# Patient Record
Sex: FEMALE | Race: WHITE
[De-identification: names, ages, dates, MRNs, and addresses within clinical notes are randomized per-mention and may not be internally consistent; named-entity substitution may affect disease eponyms.]

---

## 2017-04-03 ENCOUNTER — HOSPITAL ENCOUNTER (OUTPATIENT)
Dept: HOSPITAL 47 - RADUSWWP | Age: 47
Discharge: HOME | End: 2017-04-03
Payer: COMMERCIAL

## 2017-04-03 DIAGNOSIS — R92.8: Primary | ICD-10-CM

## 2018-01-16 ENCOUNTER — HOSPITAL ENCOUNTER (OUTPATIENT)
Dept: HOSPITAL 47 - WWCWWP | Age: 48
Discharge: HOME | End: 2018-01-16
Payer: COMMERCIAL

## 2018-01-16 DIAGNOSIS — Z12.31: Primary | ICD-10-CM

## 2018-01-16 PROCEDURE — 77067 SCR MAMMO BI INCL CAD: CPT

## 2018-01-16 PROCEDURE — 77063 BREAST TOMOSYNTHESIS BI: CPT

## 2018-01-16 NOTE — [facility] - HISTORY AND PHYSICAL



DATE OF DICTATION:

01/16/2018.



CHIEF COMPLAINT:

The patient is here for her routine gynecologic exam and mammogram.



HPI:

This is a 47-year-old, G2, P2, with an LMP of 2008.  She is status post vaginal

hysterectomy for benign reasons.  The patient states she has occasional hot flashes at

night, which are not very severe.  She has noticed this for about 1 month.  She is

otherwise without complaints.



PAST MEDICAL HISTORY:

Allergy and exercise-induced asthma.



MEDICATIONS:

1. Zyrtec p.r.n.

2. Multivitamin daily.

3. Calcium with vitamin D 1 daily.



ALLERGIES:

No known drug allergies.



PAST SURGICAL HISTORY:

Vaginal hysterectomy with rectocele repair in 2008, cystocele repair in 2010, nasal

septum surgery 2013.



PAST GYN:

She is status post vaginal hysterectomy for benign reasons and has no history of STD's.



FAMILY HISTORY:

Aunt had breast cancer.



SOCIAL HISTORY:

She denies tobacco and drug use and has about two alcohol-containing drinks per month.

She has been  since 1999 and is a  in [place].



REVIEW OF SYSTEMS:

Weight has been stable.  She denies respiratory, cardiac or GI problems.



PHYSICAL EXAM:

Blood pressure 118/71, height 5 feet 7 inches, weight 169 pounds, BMI 26, temperature

97.8, pulse 78/

This is a well-developed, well-nourished, white female, who is alert and oriented x3,

in no acute distress.

HEENT is within normal limits.

NECK:  Supple without mass or thyromegaly.

CHEST AND LUNGS:  Clear to auscultation.

HEART:  Regular rate and rhythm.  Breasts are without mass or discharge.  Axillary exam

is negative for adenopathy.

BACK:  Negative for CVA tenderness.

ABDOMEN:  Soft, nontender, without palpable masses.

PELVIC EXAM:  Normal external genitalia.  There is no significant atrophy.  Vagina

appears normal without significant atrophy and there is no evidence of prolapse.

Bimanual exam is negative for mass or tenderness.

Rectal exam is negative for mass or tenderness and is negative for occult blood.

EXTREMITIES:  Nontender.



IMPRESSION:

A 47-year-old perimenopausal female who is status post vaginal hysterectomy with normal

gynecologic exam.



PLAN:

1. Pap smears have been discontinued.

2. Self-breast examination was discussed.

3. Mammogram will be done today.

4. Osteoporosis prevention was discussed.

5. The patient would like to increase exercise and because of her history of allergy-

    induced and exercise-induced asthma, she is requesting an albuterol inhaler

    prescription.  A prescription for ProAir HFA inhaler 2 puffs q.6 hours p.r.n. was

    given to the patient.  She will follow up with her primary care physician for

    additional prescriptions or worsening of her asthma symptoms.

6. She will return in 1 year.





ROLDAN / MANAV: [id number] / Job#: [id number]

## 2018-01-17 NOTE — MM
Reason for exam: screening  (asymptomatic).

Last mammogram was performed 1 year and 3 months ago.



History:

Family history of premenopausal breast cancer in maternal aunt at age 48 and 

breast cancer in maternal grandmother.

Benign US breast aspiration single LT of the left breast, October 7, 2016.  Benign

right breast aspiration of the right breast, February 6, 2013.  Benign right 

breast aspiration additional of the right breast, February 6, 2013.  Benign US 

right core biopsy of the right breast, February 19, 2008.



Physical Findings:

A clinical breast exam by your physician is recommended on an annual basis and 

results should be correlated with mammographic findings.



MG 3D Screening Mammo W/Cad

Bilateral CC and MLO view(s) were taken.

Prior study comparison: October 7, 2016, left breast MG diagnostic mammo LT wo 

CAD.  September 26, 2016, left breast US breast workup limited LT.  September 13, 2016, bilateral MG 3d screening mammo w/cad.

The breast tissue is extremely dense which could obscure a lesion on mammography. 

No suspicious abnormality. Bilateral breast biopsy markers were noted.





ASSESSMENT: Negative, BI-RAD 1



RECOMMENDATION:

Routine screening mammogram of both breasts in 1 year.

## 2019-02-27 ENCOUNTER — HOSPITAL ENCOUNTER (OUTPATIENT)
Dept: HOSPITAL 47 - WWCWWP | Age: 49
Discharge: HOME | End: 2019-02-27
Payer: COMMERCIAL

## 2019-02-27 VITALS
TEMPERATURE: 97.9 F | DIASTOLIC BLOOD PRESSURE: 75 MMHG | RESPIRATION RATE: 18 BRPM | SYSTOLIC BLOOD PRESSURE: 109 MMHG | HEART RATE: 73 BPM

## 2019-02-27 VITALS — BODY MASS INDEX: 27.1 KG/M2

## 2019-02-27 DIAGNOSIS — Z12.31: Primary | ICD-10-CM

## 2019-02-27 PROCEDURE — 77067 SCR MAMMO BI INCL CAD: CPT

## 2019-02-27 PROCEDURE — 77063 BREAST TOMOSYNTHESIS BI: CPT

## 2019-02-27 NOTE — P.HPOB
History of Present Illness


H&P Date: 19


Chief Complaint: The patient is here for her routine gynecologic exam and 

mammogram.


This is a 48-year-old  with an LMP of .  The patient status post 

vaginal hysterectomy for benign reasons.  She states she had mild hot flashes 

during the past couple of years and they seem to be coming even less often.  

She is otherwise without complaints.





Review of Systems


The patient has gained four pounds over the last year.  She denies respiratory, 

cardiac, or G.I. problems.








Past Medical History


Past Medical History: Asthma (Exercise-induced)


Additional Past Medical History / Comment(s): Seasonal allergies and exercise-

induced asthma.


History of Any Multi-Drug Resistant Organisms: None Reported


Past Surgical History: Hysterectomy (Vaginal)


Additional Past Surgical History / Comment(s): Vaginal hysterectomy with 

rectocele repair .  Cystocele repair .  Nasal septum surgery.  Bunion 

removal both feet


Past Psychological History: No Psychological Hx Reported


Smoking Status: Never smoker


Past Alcohol Use History: Rare (2 per month)


Past Drug Use History: None Reported


Additional History: She has been  since  and is a  

in [place].





- Past Family History


  ** Mother


Family Medical History: No Reported History


Additional Family Medical History / Comment(s): Aunt had breast cancer.





Medications and Allergies


 Home Medications











 Medication  Instructions  Recorded  Confirmed  Type


 


Cetirizine HCl [Zyrtec] 10 mg PO DAILY 19 History


 


Multivit with Calcium,Iron,Min 1 each PO DAILY 19 History





[Women's Multivitamin]    











 Allergies











Allergy/AdvReac Type Severity Reaction Status Date / Time


 


No Known Allergies Allergy   Verified 19 08:40














Exam


 Vital Signs











  Temp Pulse Resp BP Pulse Ox


 


 19 08:11  97.9 F  73  18  109/75  99








 Intake and Output











 19





 22:59 06:59 14:59


 


Other:   


 


  Weight   78.471 kg











Height 5'7", weight 173 pounds, BMI 27.1.





This is a well-developed well-nourished white female who is alert and oriented 

times 3 in no acute distress.


HEENT: Within normal limits.


NECK: Supple without mass or thyromegaly.


CHEST AND LUNGS: Clear to auscultation.


HEART: Regular rate and rhythm.


BREASTS: Are without mass or discharge.


AXILLARY EXAM: Negative for adenopathy.


BACK: Negative for CVA tenderness.


ABDOMEN: Soft, nontender, without palpable masses.  


PELVIC EXAM: External genitalia appears normal.  Vagina appears normal without 

significant atrophy. There is no evidence of prolapse. Bimanual examination is 

negative for mass or tenderness.


RECTAL EXAM: Rectovaginal exam is negative for mass or tenderness and is 

negative for occult blood.


EXTREMITIES: Nontender.





IMPRESSION:


1.  48-year-old perimenopausal female who is status post vaginal hysterectomy 

with normal gynecologic exam.


2.  History of previous cystocele and rectocele repairs with no evidence of 

prolapse at this time.


3.  Minimal vasomotor symptoms.





PLAN: 


1.  Pap smears have been discontinued


2. Self breast awareness was discussed with the patient.


3.  Screening mammogram will be done today.


4. Osteoporosis prevention was discussed.  I have stressed the importance of 

adequate calcium, vitamin D and regular exercise.  Recommended amounts of 

calcium and vitamin D were also discussed.


5.  She will return in one year.

## 2019-02-28 NOTE — MM
Reason for exam: screening  (asymptomatic).

Last mammogram was performed 1 year and 1 month ago.



History:

Family history of premenopausal breast cancer in maternal aunt at age 48 and 

breast cancer in maternal grandmother.

Benign US breast aspiration single LT of the left breast, October 7, 2016.  Benign

right breast aspiration of the right breast, February 6, 2013.  Benign right 

breast aspiration additional of the right breast, February 6, 2013.  Benign US 

right core biopsy of the right breast, February 19, 2008.



Physical Findings:

A clinical breast exam by your physician is recommended on an annual basis and 

results should be correlated with mammographic findings.



MG 3D Screening Mammo W/Cad

Bilateral CC and MLO view(s) were taken.

Prior study comparison: January 16, 2018, bilateral MG 3d screening mammo w/cad.  

October 7, 2016, left breast MG diagnostic mammo LT wo CAD.

The breast tissue is heterogeneously dense. This may lower the sensitivity of 

mammography.  Previous mammotome biopsy in the right breast x 3 and in the left 

breast. There is no discrete abnormality.





ASSESSMENT: Benign, BI-RAD 2



RECOMMENDATION:

Routine screening mammogram of both breasts in 1 year.

## 2020-08-09 ENCOUNTER — HOSPITAL ENCOUNTER (EMERGENCY)
Dept: HOSPITAL 47 - EC | Age: 50
Discharge: HOME | End: 2020-08-09
Payer: COMMERCIAL

## 2020-08-09 VITALS — RESPIRATION RATE: 17 BRPM | DIASTOLIC BLOOD PRESSURE: 71 MMHG | HEART RATE: 55 BPM | SYSTOLIC BLOOD PRESSURE: 108 MMHG

## 2020-08-09 VITALS — TEMPERATURE: 98 F

## 2020-08-09 DIAGNOSIS — Z79.899: ICD-10-CM
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
## 2020-08-09 DIAGNOSIS — S30.0XXA: Primary | ICD-10-CM

## 2020-08-09 PROCEDURE — 74176 CT ABD & PELVIS W/O CONTRAST: CPT

## 2020-08-09 PROCEDURE — 99284 EMERGENCY DEPT VISIT MOD MDM: CPT

## 2020-08-09 PROCEDURE — 96374 THER/PROPH/DIAG INJ IV PUSH: CPT

## 2020-08-09 NOTE — CT
EXAMINATION TYPE: CT abdomen pelvis wo con

 

DATE OF EXAM: 8/9/2020

 

COMPARISON: 6/22/2016

 

HISTORY: pt fall, hip pain

 

CT DLP: 1075.3 mGycm

Automated exposure control for dose reduction was used.

Images obtained from the diaphragm to the floor the pelvis with no contrast.

 

Lung bases are clear of consolidation. There is no pleural effusion. Heart size is normal. There is n
o pericardial effusion. There is elevated right diaphragm. Liver shows no focal defect. Gallbladder a
ppears normal. Spleen appears normal. Stomach is intact. The bile ducts are not dilated. The pancreas
 appears normal.

 

There is no adrenal mass. Kidneys have normal size. There is no hydronephrosis. Ureters are not dilat
ed. There is no retroperitoneal adenopathy. Bladder distends smoothly. There is no inguinal hernia. T
here is no free fluid in the pelvis.

 

There is no mesenteric edema. There is no ascites or free air. There is no sign of a bowel obstructio
n. Appendix is posterior in the pelvis appears normal. Uterus appears absent. There is L5 spondylolys
is. There is 1 cm L5-S1 spondylolisthesis. There is no lumbar compression fracture. Bony pelvis appea
rs intact. The proximal femurs and hip joints are intact. There is no evidence of hip dysplasia. Ther
e is no ascites. There is no free air. There is mild lumbar levo scoliosis. There is no evidence of a
 bowel obstruction.

 

IMPRESSION:

No sign of acute traumatic injury of the abdomen and pelvis. There is chronic elevation of the right 
diaphragm that could relate to some diaphragm paralysis. Unchanged. There is first-degree L5-S1 spond
ylolisthesis with L5 spondylolysis unchanged.

## 2020-08-09 NOTE — ED
General Adult HPI





- General


Chief complaint: Back Pain/Injury


Stated complaint: fall, back injury


Time Seen by Provider: 08/09/20 19:13


Source: patient


Mode of arrival: ambulatory


Limitations: no limitations





- History of Present Illness


Initial comments: 


Dictation was produced using dragon dictation software. please excuse any 

grammatical, word or spelling errors. 





This patient was cared for during a federal and state declared state of 

emergency secondary to Covid 19





Chief Complaint: 50-year-old female presents after fall.





History of Present Illness: 50-year-old female she has no comorbidities.  She 

was on a dock between the boat back after a boat ride.  Patient states there 

were some loose boards and a dock.  Her leg fell through and she landed on her 

right gluteal and right lower back.  Patient states the pain was severe.  She 

has significant pain with ambulation.  Patient denies any numbness and 

paresthesias.  She denies any midline back pain.  She has no other complaints.  

The fall occurred approximately one hour prior to arrival








The ROS documented in this emergency department record has been reviewed and 

confirmed by me.  Those systems with pertinent positive or negative responses 

have been documented in the HPI.  All other systems are other negative and/or 

noncontributory.








PHYSICAL EXAM:


General Impression: Alert and oriented x3, acute distress secondary to pain


HEENT: Normocephalic atraumatic, extra-ocular movements intact, pupils equal and

reactive to light bilaterally, mucous membranes moist.


Cardiovascular: Heart regular rate and rhythm


Chest: Able to complete full sentences, no retractions, no tachypnea


Abdomen: abdomen soft, non-tender, non-distended, no organomegaly


Musculoskeletal: Pulses present and equal in all extremities, no peripheral 

edema


Tenderness to palpation over the right gluteal region and right lower back, no 

skin changes, mild superficial abrasion over the right lower back, pain elicited

with extension at the right hip.  Passive range of motion of the right lower 

extremity is intact.


Motor:  no focal deficits noted


Neurological: CN II-XII grossly intact, no focal motor or sensory deficits noted


Skin: Intact with no visualized rashes


Psych: Normal affect and mood





ED course: 51 yo Female presents with right lower back pain and right gluteal 

pain after fall.  Vital signs upon arrival are within acceptable limits.  

Computed tomography scan of the abdomen pelvis was obtained to evaluate for 

dramatic injuries.  CT is unremarkable.  Patient given Lidoderm patch and 

morphine with improvement of symptoms.  Patient clear for discharge.  She is 

given prescriptions for analgesics to take home.




















- Related Data


                                Home Medications











 Medication  Instructions  Recorded  Confirmed


 


Cetirizine HCl [Zyrtec] 10 mg PO DAILY 02/27/19 02/27/19


 


Multivit with Calcium,Iron,Min 1 each PO DAILY 02/27/19 02/27/19





[Women's Multivitamin]   








                                  Previous Rx's











 Medication  Instructions  Recorded


 


HYDROcodone/APAP 5-325MG [Norco 1 tab PO Q6HR PRN 3 Days #12 tab 08/09/20





5-325]  











                                    Allergies











Allergy/AdvReac Type Severity Reaction Status Date / Time


 


No Known Allergies Allergy   Verified 08/09/20 19:05














Review of Systems


ROS Statement: 


Those systems with pertinent positive or pertinent negative responses have been 

documented in the HPI.





ROS Other: All systems not noted in ROS Statement are negative.





Past Medical History


Past Medical History: Asthma


Additional Past Medical History / Comment(s): Seasonal allergies and exercise-

induced asthma.


History of Any Multi-Drug Resistant Organisms: None Reported


Past Surgical History: Hysterectomy


Additional Past Surgical History / Comment(s): Vaginal hysterectomy with 

rectocele repair 2008.  Cystocele repair 2010.  Nasal septum surgery.  Bunion 

removal both feet


Past Psychological History: No Psychological Hx Reported


Smoking Status: Never smoker


Past Alcohol Use History: Rare


Past Drug Use History: None Reported





- Past Family History


  ** Mother


Family Medical History: No Reported History


Additional Family Medical History / Comment(s): Aunt had breast cancer.





General Exam


Limitations: no limitations





Course


                                   Vital Signs











  08/09/20





  19:05


 


Temperature 98.0 F


 


Pulse Rate 73


 


Respiratory 16





Rate 


 


Blood Pressure 140/82


 


O2 Sat by Pulse 97





Oximetry 














Disposition


Clinical Impression: 


 Back contusion





Disposition: HOME SELF-CARE


Condition: Good


Instructions (If sedation given, give patient instructions):  Acute Low Back Pa

in (ED)


Prescriptions: 


HYDROcodone/APAP 5-325MG [Norco 5-325] 1 tab PO Q6HR PRN 3 Days #12 tab


 PRN Reason: Severe Pain


Is patient prescribed a controlled substance at d/c from ED?: Yes


If prescribed controlled substance>3 days was MAPS reviewed?: Prescribed <3 Days


Referrals: 


Shirin Cisneros MD [Primary Care Provider] - 1-2 days


Time of Disposition: 20:49

## 2020-08-17 ENCOUNTER — HOSPITAL ENCOUNTER (OUTPATIENT)
Dept: HOSPITAL 47 - RADXRMAIN | Age: 50
Discharge: HOME | End: 2020-08-17
Attending: FAMILY MEDICINE
Payer: COMMERCIAL

## 2020-08-17 DIAGNOSIS — M47.897: ICD-10-CM

## 2020-08-17 DIAGNOSIS — M54.5: ICD-10-CM

## 2020-08-17 DIAGNOSIS — M43.17: Primary | ICD-10-CM

## 2020-08-17 PROCEDURE — 73521 X-RAY EXAM HIPS BI 2 VIEWS: CPT

## 2020-08-17 PROCEDURE — 72110 X-RAY EXAM L-2 SPINE 4/>VWS: CPT

## 2020-08-17 NOTE — XR
EXAMINATION TYPE: XR Hip Bilateral and AP pelvis

 

DATE OF EXAM: 8/17/2020

 

COMPARISON: CT abdomen pelvis 8/9/2020

 

HISTORY: Hip pain, low back pain. Recent fall.

 

TECHNIQUE: A single AP view of the pelvis is obtained. Two views of the bilateral hips are obtained. 
 

 

FINDINGS:  There is no acute fracture/dislocation evident in the pelvis.  The sacroiliac joints appea
r symmetric and unremarkable.  There is minimal degenerative spurring of the right hip. Pelvic phlebo
liths. The overlying soft tissue appears unremarkable.

 

Two views of bilateral hips show no acute fracture or dislocation.  No focal lytic or sclerotic lesio
n seen in the proximal femurs.  The overlying soft tissue is unremarkable.  

 

IMPRESSION:  There is no acute fracture or dislocation in the pelvis or bilateral hips.

## 2020-08-17 NOTE — XR
EXAMINATION TYPE: XR lumbosacral spine min 4V

 

DATE OF EXAM: 8/17/2020

 

CLINICAL HISTORY: Hip pain and lower back pain. Recent fall.

 

TECHNIQUE: Frontal, lateral, and oblique images of the lumbar spine are obtained.

 

COMPARISON: CT abdomen pelvis 8/9/2020

 

FINDINGS:  There are 5 lumbar type vertebral bodies identified.  The lumbar spine shows satisfactory 
alignment without evidence of acute fracture or dislocation. Vertebral body heights are within normal
 limits. Multilevel facet arthropathy. There is moderate L5-S1 disc space narrowing. Bilateral L5 par
s defects. There is grade 1 anterolisthesis of L5 on S1. The overlying soft tissue appears unremarkab
le.

 

IMPRESSION:  

1. No acute fracture or dislocation is seen in the lumbar spine.

2. Degenerative changes as above.

3. Bilateral L5 pars defects with grade 1 anterolisthesis of L5 on S1.

## 2020-08-25 ENCOUNTER — HOSPITAL ENCOUNTER (OUTPATIENT)
Dept: HOSPITAL 47 - WWCWWP | Age: 50
Discharge: HOME | End: 2020-08-25
Attending: OBSTETRICS & GYNECOLOGY
Payer: COMMERCIAL

## 2020-08-25 VITALS
DIASTOLIC BLOOD PRESSURE: 75 MMHG | HEART RATE: 78 BPM | TEMPERATURE: 98 F | SYSTOLIC BLOOD PRESSURE: 112 MMHG | RESPIRATION RATE: 18 BRPM

## 2020-08-25 DIAGNOSIS — Z12.31: Primary | ICD-10-CM

## 2020-08-25 PROCEDURE — 77067 SCR MAMMO BI INCL CAD: CPT

## 2020-08-25 PROCEDURE — 77063 BREAST TOMOSYNTHESIS BI: CPT

## 2020-08-25 NOTE — P.HPOB
History of Present Illness


H&P Date: 20


Chief Complaint: The patient is here for her routine gynecologic exam and ma

mmogram.


This is a 50-year-old  with an LMP of .  The patient is status post 

vaginal hysterectomy for benign reasons.  The patient continues to have mild hot

flashes which are very manageable.  She is otherwise without gynecologic 

complaints.  She recently fell off of a viable boating platform and had some 

bruising and contusions but denies any serious injury.








Review of Systems


The patient's weight has been stable over the last year.  She denies respiratory

or cardiac problems.  GI: Some constipation related to no: She had been taking 

after her fall.  She is weaning off of the choana has things improve.








Past Medical History


Past Medical History: Asthma


Additional Past Medical History / Comment(s): Seasonal allergies and exercise-

induced asthma. PAST GYN HISTORY: She has no history of STDs.


History of Any Multi-Drug Resistant Organisms: None Reported


Past Surgical History: Hysterectomy


Additional Past Surgical History / Comment(s): Vaginal hysterectomy with 

rectocele repair .  Cystocele repair .  Nasal septum surgery.  Bunion 

removal both feet


Past Psychological History: No Psychological Hx Reported


Smoking Status: Never smoker


Past Alcohol Use History: Occasional (2 per month)


Past Drug Use History: None Reported


Additional History: She has been masses  and is a  in 

[place].





- Past Family History


  ** Mother


Family Medical History: No Reported History


Additional Family Medical History / Comment(s): Aunt had breast cancer.





Medications and Allergies


                                Home Medications











 Medication  Instructions  Recorded  Confirmed  Type


 


Cetirizine HCl [Zyrtec] 10 mg PO DAILY 19 History


 


Multivit with Calcium,Iron,Min 1 each PO DAILY 19 History





[Women's Multivitamin]    


 


HYDROcodone/APAP 5-325MG [Norco 1 tab PO Q6HR PRN 3 Days #12 tab 20 Rx





5-325]    








                                    Allergies











Allergy/AdvReac Type Severity Reaction Status Date / Time


 


No Known Allergies Allergy   Verified 20 08:09














Exam


                                   Vital Signs











  Temp Pulse Resp BP Pulse Ox


 


 20 08:09  98.0 F  78  18  112/75  96








                                Intake and Output











 20





 22:59 06:59 14:59


 


Other:   


 


  Weight   78.018 kg











Height 5 feet 5 inches, weight 172 pounds, BMI 28.6.





This is a well-developed well-nourished white female who is alert and oriented 

times 3 in no acute distress.


HEENT: Within normal limits.


NECK: Supple without mass or thyromegaly.


CHEST AND LUNGS: Clear to auscultation.


HEART: Regular rate and rhythm.


BREASTS: Are without mass or discharge.


AXILLARY EXAM: Negative for adenopathy.


BACK: Negative for CVA tenderness.


ABDOMEN: Soft, nontender, without palpable masses.  


PELVIC EXAM: External genitalia appears normal.  Vagina appears normal. There is

no evidence of prolapse. Bimanual examination is negative for mass or 

tenderness.


RECTAL EXAM: Rectovaginal exam is negative for mass or tenderness and is 

negative for occult blood.  There is moderate firm stool in the rectum.


EXTREMITIES: Nontender.





IMPRESSION:


1.  50-year-old menopausal female who is status post vaginal hysterectomy for 

benign reasons with normal gynecologic exam. 


2.  Mild vasomotor symptoms secondary to the menopausal change.


3. History of previous cystocele and rectocele repairs with no evidence of 

prolapse at this time.





PLAN: 


1.  Pap smears have been discontinued.


2. Self breast awareness was discussed with the patient.


3.  Screening mammogram will be done today.


4. Osteoporosis prevention was discussed.  I have stressed the importance of 

adequate calcium, vitamin D and regular exercise.  Recommended amounts of 

calcium and vitamin D were also discussed.


5.  Screening colonoscopy was recommended because of her age.  She will speak 

with her PCP about arranging this.


6. She was advised to return in one year for her annual well woman exam.

## 2020-08-26 NOTE — MM
Reason for exam: screening  (asymptomatic).

Last mammogram was performed 1 year and 6 months ago.



History:

Family history of premenopausal breast cancer in maternal aunt at age 48 and 

breast cancer in maternal grandmother.

Benign US breast aspiration single LT of the left breast, October 7, 2016.  Benign

right breast aspiration of the right breast, February 6, 2013.  Benign right 

breast aspiration additional of the right breast, February 6, 2013.  Benign US 

right core biopsy of the right breast, February 19, 2008.



Physical Findings:

A clinical breast exam by your physician is recommended on an annual basis and 

results should be correlated with mammographic findings.



MG 3D Screening Mammo W/Cad

Bilateral CC and MLO view(s) were taken.

Prior study comparison: February 27, 2019, bilateral MG 3d screening mammo w/cad. 

January 16, 2018, bilateral MG 3d screening mammo w/cad.

The breast tissue is extremely dense which could obscure a lesion on mammography. 

There is chronic nodularity bilaterally.  No significant changes when compared 

with prior studies.





ASSESSMENT: Benign, BI-RAD 2



RECOMMENDATION:

Routine screening mammogram of both breasts in 1 year.

## 2021-12-21 ENCOUNTER — HOSPITAL ENCOUNTER (OUTPATIENT)
Dept: HOSPITAL 47 - WWCWWP | Age: 51
End: 2021-12-21
Attending: OBSTETRICS & GYNECOLOGY
Payer: COMMERCIAL

## 2021-12-21 VITALS
DIASTOLIC BLOOD PRESSURE: 84 MMHG | HEART RATE: 80 BPM | SYSTOLIC BLOOD PRESSURE: 120 MMHG | TEMPERATURE: 98.1 F | RESPIRATION RATE: 18 BRPM

## 2021-12-21 DIAGNOSIS — I73.9: ICD-10-CM

## 2021-12-21 DIAGNOSIS — J45.909: ICD-10-CM

## 2021-12-21 DIAGNOSIS — Z12.31: Primary | ICD-10-CM

## 2021-12-21 DIAGNOSIS — Z90.710: ICD-10-CM

## 2021-12-21 PROCEDURE — 77067 SCR MAMMO BI INCL CAD: CPT

## 2021-12-21 PROCEDURE — 77063 BREAST TOMOSYNTHESIS BI: CPT

## 2021-12-21 NOTE — P.HPOB
History of Present Illness


H&P Date: 21


Chief Complaint: The patient is here for her routine gynecologic exam and ma

mmogram.





This is a 51-year-old  with an LMP of .  The patient is status post 

vaginal hysterectomy for benign reasons.  She is having intermittent moderate 

hot flashes that can occur day and night.  They can be bothersome and 

occasionally interferes with her ability to concentrate.  She is otherwise 

without complaints.





Review of Systems





The patient has gained 7 pounds over the last year.  She denies respiratory, 

cardiac, or G.I. problems.





Past Medical History


Past Medical History: Asthma


Additional Past Medical History / Comment(s): Seasonal allergies and exercise-

induced asthma. PAST GYN HISTORY: She has no history of STDs.


History of Any Multi-Drug Resistant Organisms: None Reported


Past Surgical History: Hysterectomy


Additional Past Surgical History / Comment(s): Vaginal hysterectomy with 

rectocele repair .  Cystocele repair .  Nasal septum surgery.  Bunion 

removal both feet


Past Psychological History: No Psychological Hx Reported


Smoking Status: Never smoker


Past Alcohol Use History: Occasional (1 per week)


Past Drug Use History: None Reported


Additional History: She has been  since  and is a 

in [place].





- Past Family History


  ** Mother


Family Medical History: No Reported History


Additional Family Medical History / Comment(s): Aunt had breast cancer.





Medications and Allergies


                                Home Medications











 Medication  Instructions  Recorded  Confirmed  Type


 


Cetirizine HCl [Zyrtec] 10 mg PO DAILY 19 History


 


Multivit with Calcium,Iron,Min 1 each PO DAILY 19 History





[Women's Multivitamin]    


 


Ascorbic Acid [Vitamin C] 500 mg PO DAILY 21 History


 


Calcium Carbonate [Calcium] 600 mg PO DAILY 21 History


 


Zinc 50 mg PO DAILY 21 History








                                    Allergies











Allergy/AdvReac Type Severity Reaction Status Date / Time


 


No Known Allergies Allergy   Verified 21 10:16














Exam


                                   Vital Signs











  Temp Pulse Resp BP Pulse Ox


 


 21 10:10  98.1 F  80  18  120/84  96








                                Intake and Output











 21





 22:59 06:59 14:59


 


Other:   


 


  Weight   81.193 kg














Height 5 feet 5 inches, weight 179 pounds, BMI 29.8.





This is a well-developed well-nourished white female who is alert and oriented 

times 3 in no acute distress.


HEENT: Within normal limits.


NECK: Supple without mass or thyromegaly.


CHEST AND LUNGS: Clear to auscultation.


HEART: Regular rate and rhythm.


BREASTS: Are without mass or discharge.


AXILLARY EXAM: Negative for adenopathy.


BACK: Negative for CVA tenderness.


ABDOMEN: Soft, nontender, without palpable masses.  


PELVIC EXAM: External genitalia appears normal with mild atrophy.  Vagina 

appears normal mild atrophy. There is no evidence of prolapse. Bimanual 

examination is negative for mass or tenderness.


RECTAL EXAM: Rectovaginal exam is negative for mass or tenderness and is 

negative for occult blood.  There is moderate stool in the rectum.


EXTREMITIES: Nontender.





IMPRESSION:


1.  51-year-old in a [place] female who is status post vaginal hysterectomy with 

normal gynecologic exam.


2.  Mild to moderate vasomotor symptoms secondary to the menopausal change.





PLAN: 


1.  Pap smears have been discontinued.


2. Self breast awareness was discussed with the patient.  We have also discussed

 symptoms associated with inflammatory breast cancer.


3.  Screening mammogram will be done today.


4. Osteoporosis prevention was discussed.  I have stressed the importance of 

adequate calcium, vitamin D and regular exercise.  Recommended amounts of 

calcium and vitamin D were also discussed.


5.  I have recommended screening colonoscopy based on her age.  She will arrange

 this through her PCP.  She plans to do this in the summer.


6.  We have had a long discussion regarding menopausal changes.  We have 

discussed various ways of managing vasomotor symptoms.  We have discussed 

conservative measures, ERT, and other medical treatments.  We have discussed 

pros and cons of ERT.  At this time we will continue conservative measures.  She

 will call if she is having worsening symptoms or if she would like to pursue 

other options.  Information on menopause was given to the patient.


7.  She was advised to return in one year for her annual well woman exam and as 

needed.

## 2021-12-22 NOTE — MM
Reason for exam: screening  (asymptomatic).

Last mammogram was performed 1 year and 4 months ago.



History:

Family history of premenopausal breast cancer in maternal aunt at age 48 and 

breast cancer in maternal grandmother.

Benign US breast aspiration single LT of the left breast, October 7, 2016.  Benign

right breast aspiration of the right breast, February 6, 2013.  Benign right 

breast aspiration additional of the right breast, February 6, 2013.  Benign US 

right core biopsy of the right breast, February 19, 2008.



Physical Findings:

A clinical breast exam by your physician is recommended on an annual basis and 

results should be correlated with mammographic findings.



MG 3D Screening Mammo W/Cad

Bilateral CC and MLO view(s) were taken.

Prior study comparison: August 25, 2020, bilateral MG 3d screening mammo w/cad.  

February 27, 2019, bilateral MG 3d screening mammo w/cad.

The breast tissue is heterogeneously dense. This may lower the sensitivity of 

mammography.  Benign appearing bilateral calcifications. Previous mammotome biopsy

in the right and left breast. There is chronic nodularity in the right breast, 

stable.  No significant changes when compared with prior studies.





ASSESSMENT: Benign, BI-RAD 2



RECOMMENDATION:

Routine screening mammogram of both breasts in 1 year.

## 2023-08-15 ENCOUNTER — HOSPITAL ENCOUNTER (OUTPATIENT)
Dept: HOSPITAL 47 - WWCWWP | Age: 53
End: 2023-08-15
Attending: OBSTETRICS & GYNECOLOGY
Payer: COMMERCIAL

## 2023-08-15 VITALS
HEART RATE: 65 BPM | TEMPERATURE: 98.5 F | DIASTOLIC BLOOD PRESSURE: 81 MMHG | RESPIRATION RATE: 16 BRPM | SYSTOLIC BLOOD PRESSURE: 120 MMHG

## 2023-08-15 DIAGNOSIS — Z90.710: ICD-10-CM

## 2023-08-15 DIAGNOSIS — Z12.31: ICD-10-CM

## 2023-08-15 DIAGNOSIS — Z78.0: ICD-10-CM

## 2023-08-15 DIAGNOSIS — Z01.419: Primary | ICD-10-CM

## 2023-08-15 DIAGNOSIS — J45.909: ICD-10-CM

## 2023-08-15 DIAGNOSIS — Z80.3: ICD-10-CM

## 2023-08-15 PROCEDURE — 77067 SCR MAMMO BI INCL CAD: CPT

## 2023-08-15 PROCEDURE — 77063 BREAST TOMOSYNTHESIS BI: CPT

## 2023-08-15 NOTE — P.HPOB
History of Present Illness


H&P Date: 08/15/23


Chief Complaint: The patient is here for her routine gynecologic exam and ma

mmogram.





This is a 53-year-old  with an LMP of .  The patient still is having 

hot flashes about 2 times per day.  She is status post vaginal hysterectomy with

rectocele and later cystocele repairs, for benign reasons.  She is otherwise 

without complaints.





Review of Systems





The patient has gained 5 pounds over the last year.  She has been trying to lose

weight with diet and exercise.  She denies respiratory, cardiac, or G.I. 

problems.





Past Medical History


Past Medical History: Asthma


Additional Past Medical History / Comment(s): Seasonal allergies and exercise-

induced asthma. PAST GYN HISTORY: She has no history of STDs.


History of Any Multi-Drug Resistant Organisms: None Reported


Past Surgical History: Hysterectomy


Additional Past Surgical History / Comment(s): Vaginal hysterectomy with 

rectocele repair .  Cystocele repair .  Nasal septum surgery.  Bunion 

removal both feet.  Colonoscopy (next after 5yr).


Past Anesthesia/Blood Transfusion Reactions: No Reported Reaction


Additional Past Anesthesia/Blood Transfusion Reaction / Comment(s): no blood 

tranfusions


Past Psychological History: No Psychological Hx Reported


Smoking Status: Never smoker


Past Alcohol Use History: Occasional (1 or 2 per week.)


Past Drug Use History: None Reported


Additional History: She has been  since  and is a 

in [place].





- Past Family History


  ** Mother


Family Medical History: No Reported History


Additional Family Medical History / Comment(s): Aunt had breast cancer.





  ** Father


Family Medical History: No Reported History





Medications and Allergies


                                Home Medications











 Medication  Instructions  Recorded  Confirmed  Type


 


Cetirizine HCl [Zyrtec] 10 mg PO DAILY 02/27/19 08/15/23 History


 


Multivit with Calcium,Iron,Min 1 each PO DAILY 02/27/19 08/15/23 History





[Women's Multivitamin]    


 


Ascorbic Acid [Vitamin C] 500 mg PO DAILY 12/21/21 08/15/23 History


 


Calcium Carbonate [Calcium] 600 mg PO DAILY 12/21/21 08/15/23 History


 


Zinc 50 mg PO DAILY 12/21/21 08/15/23 History


 


Cholecalciferol [Vitamin D3 (25 25 mcg PO DAILY 08/15/23 08/15/23 History





Mcg = 1000 Iu)]    








                                    Allergies











Allergy/AdvReac Type Severity Reaction Status Date / Time


 


No Known Allergies Allergy   Verified 08/15/23 10:33














Exam


                                   Vital Signs











  Temp Pulse Resp BP Pulse Ox


 


 08/15/23 10:33  98.5 F  65  16  120/81  96








                                Intake and Output











 08/14/23 08/15/23 08/15/23





 22:59 06:59 14:59


 


Other:   


 


  Weight   83.461 kg














Height 5 feet 5 inches, weight 184 pounds, BMI 30.6.





This is a well-developed well-nourished white female who is alert and oriented 

times 3 in no acute distress.


HEENT: Within normal limits.


NECK: Supple without mass or thyromegaly.


CHEST AND LUNGS: Clear to auscultation.


HEART: Regular rate and rhythm.


BREASTS: Are without mass or discharge.


AXILLARY EXAM: Negative for adenopathy.


BACK: Negative for CVA tenderness.


ABDOMEN: Soft, nontender, without palpable masses.  


PELVIC EXAM: External genitalia appears normal with minimal atrophy.  Vagina a

ppears normal with minimal atrophy. There is no evidence of prolapse. Bimanual 

examination is negative for mass or tenderness.


RECTAL EXAM: Rectovaginal exam is negative for mass or tenderness and is 

negative for occult blood.


EXTREMITIES: Nontender.





IMPRESSION:


1.  53-year-old menopausal female status post vaginal hysterectomy with 

posterior and anterior repairs for benign reasons, with normal gynecologic exam.


2.  Menopausal vasomotor symptoms which are tolerable.





PLAN: 


1.  Pap smears have been discontinued.


2. Self breast awareness was discussed with the patient.  We have also discussed

symptoms associated with inflammatory breast cancer.


3.  Screening mammogram will be done today.


4.  Weight control was discussed the patient.  I have stressed the importance of

good nutrition, adequate fiber and regular meals.


5.  We have discussed her vasomotor symptoms.  Suggestions were given.


6. She was advised to return in one year for her annual well woman exam.

## 2023-08-16 NOTE — MM
Reason for Exam: Screening  (asymptomatic). 

Last mammogram was performed 1 year(s) and 8 month(s) ago. 





Patient History: 

Menarche at age 13. First Full-Term Pregnancy at age 30. Late child-bearing (after 30). Hysterectomy

at age 38. Postmenopausal. 10/7/2016, Benign Cyst Aspiration on the left side. 2/6/2013, Benign Cyst

Aspiration on the right side. 2/6/2013, Benign Cyst Aspiration on the right side. 2/19/2008, Benign

Core Biopsy on the right side.

Maternal grandmother had breast cancer. Maternal aunt had breast cancer, age 48. 





Risk Values: 

Delilah 5 year model risk: 1.8%.

NCI Lifetime model risk: 13.5%.





Prior Study Comparison: 

2/27/2019 Bilateral Screening Mammogram, [facility]. 8/25/2020 Bilateral Screening Mammogram, [facility].

12/21/2021 Bilateral Screening Mammogram, [facility]. 





Tissue Density: 

The breast tissue is heterogeneously dense. This may lower the sensitivity of mammography.





Findings: 

Analyzed By CAD. 

Pattern appears symmetrical and stable. Biopsy are present bilaterally. No significant interval

changes are evident.

No suspicious groups of microcalcifications, spiculated or lobular masses, architectural distortion

or other secondary signs of malignancy are mammographically apparent. 





Overall Assessment: Benign, BI-RAD 2





Management: 

Screening Mammogram of both breasts in 1 year.

A negative mammogram report should not preclude additional follow up of suspicious palpable

abnormalities.

Patient should continue monthly self breast exam.

A clinical breast exam by your physician is recommended on an annual basis and results should be

correlated with mammographic findings.



Electronically signed and approved by: Ace Brown D.O. Radiologis

## 2024-08-20 ENCOUNTER — HOSPITAL ENCOUNTER (OUTPATIENT)
Dept: HOSPITAL 47 - RADMAMWWP | Age: 54
Discharge: HOME | End: 2024-08-20
Attending: OBSTETRICS & GYNECOLOGY
Payer: COMMERCIAL

## 2024-08-20 DIAGNOSIS — Z12.31: Primary | ICD-10-CM

## 2024-08-20 DIAGNOSIS — R92.333: ICD-10-CM

## 2024-08-20 DIAGNOSIS — Z90.710: ICD-10-CM

## 2024-08-20 DIAGNOSIS — Z80.3: ICD-10-CM

## 2024-08-20 DIAGNOSIS — Z78.0: ICD-10-CM

## 2024-08-20 PROCEDURE — 77067 SCR MAMMO BI INCL CAD: CPT

## 2024-08-20 PROCEDURE — 77063 BREAST TOMOSYNTHESIS BI: CPT

## 2024-09-06 NOTE — MM
Reason for Exam: Screening  (asymptomatic). 

Last screening mammogram was performed 12 month(s) ago.





Patient History: 

Menarche at age 13. First Full-Term Pregnancy at age 30. Late child-bearing (after 30). Hysterectomy

at age 38. Postmenopausal. 10/7/2016, Benign Cyst Aspiration on the left side. 2/6/2013, Benign Cyst

Aspiration on the right side. 2/6/2013, Benign Cyst Aspiration on the right side. 2/19/2008, Benign

Core Biopsy on the right side.

Maternal grandmother had breast cancer. Maternal aunt had breast cancer, age 48. 





Risk Values: 

Delilah 5 year model risk: 1.9%.

NCI Lifetime model risk: 13.3%.





Prior Study Comparison: 

8/25/2020 Bilateral Screening Mammogram, [facility]. 12/21/2021 Bilateral Screening Mammogram, [facility].

8/15/2023 Bilateral MG 3D screening mammo w/cad, [facility]. 





Tissue Density: 

The breasts are heterogeneously dense, which may obscure small masses.





Findings: 

Analyzed By CAD. 

There is bilateral asymmetric density are unchanged especially medial left CC view. 3 microclips in

the right breast and one microclip in the left breast. Chronic nodularity on the right.

There is no suspicious group of microcalcifications or new suspicious mass in either breast. 





Overall Assessment: Benign, BI-RAD 2





Management: 

Screening Mammogram of both breasts in 1 year.

.



Patient should continue monthly self-breast exams.  A clinical breast exam by your physician is

recommended on an annual basis.

This exam should not preclude additional follow-up of suspicious palpable abnormalities.



Note on Delilah scores and lifetime risk:

1. A Delilah score greater than 3% is considered moderate risk. If this is the case, consider

specialist referral to assess eligibility for a risk reducing agent.

2. If overall lifetime risk for the development of breast cancer is 20% or higher, the patient may

qualify for future screening with alternating mammogram and breast MRI.



Electronically signed and approved by: Amy Lagunas M.D. Radiologist

## 2024-09-26 NOTE — [facility] - HISTORY AND PHYSICAL



CHIEF COMPLAINT:

The patient is here for her routine gynecologic exam and mammogram.



HPI:

This is a 54-year-old G2, P2, with an LMP of 2008.  The patient states she has been

having hot flashes for a couple of years, but they seem to be improving this year.  She

does wake up at times hot and sweaty.  She is otherwise without gynecologic complaints.

She is status post vaginal hysterectomy and posterior repair for benign reasons.  She

later had a cystocele repair for benign reasons.  She denies any prolapse symptoms.



PAST MEDICAL HISTORY:

Allergies and exercise-induced asthma.



MEDICATIONS:

Include:

1. Zyrtec.

2. Nasacort.

3. Multivitamin.

4. Calcium supplement.



ALLERGIES:

No known drug allergies.



PAST SURGICAL HISTORY:

Unchanged from her 2023 H and P.



SOCIAL HISTORY:

She denies tobacco and drug use.  She has about 4 alcohol-containing drinks per month.

She has been  since 1999 and is a  in [place].



FAMILY HISTORY:

Unchanged from the 2023 H and P.



REVIEW OF SYSTEMS:

She believes she has gained about 3 or 4 pounds over the past year.  She denies

respiratory, cardiac, or GI problems.



PHYSICAL EXAM:

VITAL SIGNS:  Blood pressure 120/82, height 5 feet 7 inches, weight 182 pounds, BMI 28,

temperature 97.9, pulse 69, pulse oximeter 98%.

GENERAL:  This is a well-developed, well-nourished, white female, who is alert and

oriented x3, in no acute distress.

HEENT:  Within normal limits.

NECK:  Supple without mass or thyromegaly.

CHEST AND LUNGS:  Clear to auscultation.

HEART:  Regular rate and rhythm.

BREASTS:  Without mass or discharge.  Axillary exam is negative for adenopathy.

BACK:  Negative for CVA tenderness.

ABDOMEN:  Soft, nontender, without palpable masses.

PELVIC:  Normal external genitalia with mild atrophy.  The vagina appears normal with

mild atrophy.  There is no evidence of prolapse.  There is no unusual discharge.

Bimanual examination is negative for mass or tenderness.  Rectovaginal exam is negative

for mass or tenderness and is Hemoccult negative.

EXTREMITIES:  Nontender.



IMPRESSION:

1. 54-year-old menopausal female, status post vaginal hysterectomy with anterior and

    posterior repairs for benign reasons, with normal gynecologic exam.

2. No evidence of vaginal prolapse.



PLAN:

1. Pap smears have been discontinued.

2. Self breast examination was discussed.  We have reviewed symptoms of inflammatory

    breast cancer.

3. Screening mammogram will be done today.

4. Osteoporosis prevention was discussed.

5. Weight control was discussed.  I have discussed the importance of good nutrition,

    regular meals, adequate fiber, regular exercise, and regular meals.

6. The patient will return in 1 year for her well-woman examination.





MMODL / IJN: [id number] / Job#: [id number]